# Patient Record
Sex: FEMALE | Race: WHITE | NOT HISPANIC OR LATINO | Employment: OTHER | URBAN - METROPOLITAN AREA
[De-identification: names, ages, dates, MRNs, and addresses within clinical notes are randomized per-mention and may not be internally consistent; named-entity substitution may affect disease eponyms.]

---

## 2017-04-26 ENCOUNTER — APPOINTMENT (OUTPATIENT)
Dept: WOMENS IMAGING | Facility: HOSPITAL | Age: 62
End: 2017-04-26

## 2017-04-26 PROCEDURE — 77067 SCR MAMMO BI INCL CAD: CPT | Performed by: RADIOLOGY

## 2017-04-26 PROCEDURE — 77063 BREAST TOMOSYNTHESIS BI: CPT | Performed by: RADIOLOGY

## 2017-04-26 PROCEDURE — G0202 SCR MAMMO BI INCL CAD: HCPCS | Performed by: RADIOLOGY

## 2018-06-06 ENCOUNTER — APPOINTMENT (OUTPATIENT)
Dept: WOMENS IMAGING | Facility: HOSPITAL | Age: 63
End: 2018-06-06

## 2018-06-06 PROCEDURE — 77067 SCR MAMMO BI INCL CAD: CPT | Performed by: RADIOLOGY

## 2018-06-06 PROCEDURE — 77063 BREAST TOMOSYNTHESIS BI: CPT | Performed by: RADIOLOGY

## 2019-07-23 ENCOUNTER — OFFICE VISIT (OUTPATIENT)
Dept: GASTROENTEROLOGY | Facility: CLINIC | Age: 64
End: 2019-07-23

## 2019-07-23 VITALS
BODY MASS INDEX: 23.76 KG/M2 | WEIGHT: 139.2 LBS | DIASTOLIC BLOOD PRESSURE: 68 MMHG | TEMPERATURE: 97.7 F | SYSTOLIC BLOOD PRESSURE: 124 MMHG | HEIGHT: 64 IN

## 2019-07-23 DIAGNOSIS — Z12.11 SCREENING FOR COLON CANCER: Primary | ICD-10-CM

## 2019-07-23 DIAGNOSIS — R10.12 LUQ PAIN: ICD-10-CM

## 2019-07-23 PROCEDURE — 99203 OFFICE O/P NEW LOW 30 MIN: CPT | Performed by: INTERNAL MEDICINE

## 2019-07-23 NOTE — PROGRESS NOTES
Chief Complaint   Patient presents with   • Abdominal Pain   • Discuss possible colonoscopy     Lanny Aguayo is a 63 y.o. female who presents with abdominal pain.    She has some intermittent luq pain - no obvious precipitant.  Not related to eating or bowel movements.  She denies any constipation or diarrhea.  She sometimes has seepage.  She also complains of intermittent rectal bleeding which she attributes to hemorrhoids.  He denies any nausea or heartburn.  Pain is not debilitating.    Her daughter has Crohn's - no fh crc/polyps.    Last c/s 2008 - diverticulosis, IH.       Abdominal Pain   This is a recurrent problem. The current episode started more than 1 month ago. The onset quality is gradual. The problem occurs intermittently. The most recent episode lasted 3 days. The problem has been unchanged. The pain is located in the LUQ. The pain is at a severity of 3/10. The quality of the pain is dull. The abdominal pain does not radiate. Pertinent negatives include no anorexia, arthralgias, belching, constipation, diarrhea, dysuria, fever, flatus, frequency, headaches, hematochezia, hematuria, melena, myalgias, nausea, vomiting or weight loss. Nothing aggravates the pain. The pain is relieved by nothing. Prior diagnostic workup includes CT scan.     Past Medical History:   Diagnosis Date   • Bunion of right foot    • Osteopenia      Past Surgical History:   Procedure Laterality Date   • BUNIONECTOMY Right 10/31/2016    Procedure: RT BUNIONECTOMY,  DOUBLE OSTEOTOMY W/BONE GRAFT AND 2ND HAMMER TOE REPAIR ;  Surgeon: Andrew Marx MD;  Location: [facility];  Service:    • HAMMER TOE REPAIR Right 10/31/2016    Procedure: 2ND HAMMER TOE REPAIR;  Surgeon: Andrew Marx MD;  Location: [facility];  Service:    • HYSTERECTOMY     • TENSION FREE VAGINAL TAPING WITH MINI ARC SLING         Current Outpatient Medications:   •  acetaminophen (TYLENOL) 500 MG tablet, Take 500 mg by mouth Every 6 (Six) Hours As  Needed for mild pain (1-3)., Disp: , Rfl:   •  ascorbic acid (VITAMIN C) 1000 MG tablet, Take 1,000 mg by mouth Daily., Disp: , Rfl:   •  Calcium Citrate-Vitamin D (CALCIUM CITRATE + PO), Take 500 mg by mouth Daily., Disp: , Rfl:   •  Cholecalciferol (VITAMIN D) 1000 UNITS tablet, Take 1,000 Units by mouth Daily., Disp: , Rfl:   •  ibuprofen (ADVIL,MOTRIN) 200 MG tablet, Take 200 mg by mouth Every 6 (Six) Hours As Needed for mild pain (1-3)., Disp: , Rfl:   •  MAGNESIUM PO, Take 500 mg by mouth Daily., Disp: , Rfl:   •  Multiple Vitamin (MULTI-VITAMIN PO), Take 1 tablet by mouth Daily., Disp: , Rfl:   •  Probiotic Product (PROBIOTIC DAILY PO), Take 1 capsule by mouth Daily., Disp: , Rfl:   •  TURMERIC PO, Take  by mouth., Disp: , Rfl:   Allergies   Allergen Reactions   • Amoxicillin Rash     Social History     Socioeconomic History   • Marital status:      Spouse name: Not on file   • Number of children: Not on file   • Years of education: Not on file   • Highest education level: Not on file   Tobacco Use   • Smoking status: Never Smoker   • Smokeless tobacco: Never Used   Substance and Sexual Activity   • Alcohol use: Yes     Comment: occasionally   • Drug use: No     History reviewed. No pertinent family history.  Review of Systems   Constitutional: Negative for fever and weight loss.   Gastrointestinal: Positive for abdominal pain. Negative for anorexia, constipation, diarrhea, flatus, hematochezia, melena, nausea and vomiting.   Genitourinary: Negative for dysuria, frequency and hematuria.   Musculoskeletal: Negative for arthralgias and myalgias.   Neurological: Negative for headaches.     Vitals:    07/23/19 1259   BP: 124/68   Temp: 97.7 °F (36.5 °C)         07/23/19  1259   Weight: 63.1 kg (139 lb 3.2 oz)     Physical Exam   Constitutional: She appears well-developed and well-nourished.   HENT:   Head: Normocephalic and atraumatic.   Eyes: No scleral icterus.   Abdominal: Soft. She exhibits no  distension and no mass. There is no tenderness.   Neurological: She is alert.   Skin: Skin is warm and dry.   Psychiatric: She has a normal mood and affect.     No images are attached to the encounter.  No notes on file  Lanny was seen today for abdominal pain and discuss possible colonoscopy.    Diagnoses and all orders for this visit:    Screening for colon cancer  -     Case Request; Standing  -     Case Request    LUQ pain  -     Case Request; Standing  -     Case Request    Other orders  -     Follow Anesthesia Guidelines / Standing Orders; Future  -     Obtain Informed Consent; Future  -     Implement Anesthesia orders day of procedure.; Standing  -     Obtain informed consent; Standing  -     Verify bowel prep was successful; Standing  -     Give tap water enema if bowel prep was insufficient; Standing    Plan-  · Recommend starting a fiber supplement daily to bulk her stools to decrease seepage and promote regularity-she also has a history of diverticulosis  · In for an EGD and colonoscopy for further evaluation of her symptoms and for colorectal cancer screening

## 2019-08-30 ENCOUNTER — OUTSIDE FACILITY SERVICE (OUTPATIENT)
Dept: GASTROENTEROLOGY | Facility: CLINIC | Age: 64
End: 2019-08-30

## 2019-08-30 PROCEDURE — 43239 EGD BIOPSY SINGLE/MULTIPLE: CPT | Performed by: INTERNAL MEDICINE

## 2019-08-30 PROCEDURE — 45385 COLONOSCOPY W/LESION REMOVAL: CPT | Performed by: INTERNAL MEDICINE

## 2019-09-05 ENCOUNTER — TELEPHONE (OUTPATIENT)
Dept: GASTROENTEROLOGY | Facility: CLINIC | Age: 64
End: 2019-09-05

## 2019-09-05 RX ORDER — OMEPRAZOLE 20 MG/1
20 CAPSULE, DELAYED RELEASE ORAL 2 TIMES DAILY
Qty: 60 CAPSULE | Refills: 0 | Status: SHIPPED | OUTPATIENT
Start: 2019-09-05 | End: 2019-10-22 | Stop reason: SDUPTHER

## 2019-09-05 NOTE — TELEPHONE ENCOUNTER
Mild inflammation seen on gastric bx - rec 4 week course of omeprazole - if luq discomfort does not improve with omeprazole, rec CT scan. Schedule office f/u in 4-6 wks with elijah or me    The polyp(s) biopsies showed adenomatous change. This is not cancerous but is considered potentially precancerous. Follow-up colonoscopy in 5 years is advised.

## 2019-09-26 NOTE — TELEPHONE ENCOUNTER
call back   Received: Today   Message Contents   Mak Mounika Orlando  [facility]   Phone Number: [phone]             Pt  called and said the pt  is out of town and not able to answer he needs you to call his number only.      Call to spouse, Zeke (see hipaa).  Advise per Dr Waldron that mild inflammation seen on gastric bx - recommend 4 wk course of omeprazole.  If LUQ discomfort does not improve with omeprazole, rec CT.  Schedule office f/u in 4-6 wks.    Polyp(x)  bx showed adenomatous change.  This is not cancerous, but is considered potentially precancerous.  F/u c/s in 5 yrs is advised.    Zeke verb understanding.  Pt currently in [place].  He will check with her if picked up omeprazole as prescribed on 9/5.  Pt returning 10/7 - she will call after that to make f/u appt.

## 2019-09-26 NOTE — TELEPHONE ENCOUNTER
call back   Received: Today   Message Contents   Darion Mak RegSched Rep  [facility]   Phone Number: [phone]             Pt  is calling back.      Call to Zeke.  States omeprazole was not picked up 9/5.  Currently out of town - will try to obtain when returns for pt to begin when back from [place].

## 2019-10-22 ENCOUNTER — OFFICE VISIT (OUTPATIENT)
Dept: GASTROENTEROLOGY | Facility: CLINIC | Age: 64
End: 2019-10-22

## 2019-10-22 VITALS
WEIGHT: 142.6 LBS | DIASTOLIC BLOOD PRESSURE: 72 MMHG | SYSTOLIC BLOOD PRESSURE: 128 MMHG | HEIGHT: 64 IN | TEMPERATURE: 98.2 F | BODY MASS INDEX: 24.34 KG/M2

## 2019-10-22 DIAGNOSIS — K21.9 GASTROESOPHAGEAL REFLUX DISEASE WITHOUT ESOPHAGITIS: ICD-10-CM

## 2019-10-22 DIAGNOSIS — D12.6 ADENOMATOUS POLYP OF COLON, UNSPECIFIED PART OF COLON: ICD-10-CM

## 2019-10-22 DIAGNOSIS — K29.00 OTHER ACUTE GASTRITIS WITHOUT HEMORRHAGE: ICD-10-CM

## 2019-10-22 DIAGNOSIS — R10.12 LEFT UPPER QUADRANT PAIN: Primary | ICD-10-CM

## 2019-10-22 PROCEDURE — 99214 OFFICE O/P EST MOD 30 MIN: CPT | Performed by: NURSE PRACTITIONER

## 2019-10-22 RX ORDER — OMEPRAZOLE 20 MG/1
20 CAPSULE, DELAYED RELEASE ORAL 2 TIMES DAILY
Qty: 60 CAPSULE | Refills: 2 | Status: SHIPPED | OUTPATIENT
Start: 2019-10-22

## 2019-10-22 NOTE — PROGRESS NOTES
Chief Complaint   Patient presents with   • scope follow up       Lanny Aguayo is a  64 y.o. female here for a follow up visit for abdominal pain.    HPI  64-year-old female presents today accompanied by her  for follow-up visit for left upper quadrant abdominal pain and GERD.  She is a patient of Dr. Waldron.  She underwent EGD and colonoscopy on 8/30/2019.  EGD showed hiatal hernia with gastritis.  Path was negative.  Colonoscopy showed nonbleeding internal hemorrhoids, diverticulosis as well as an adenomatous colon polyp.  Recall in 5 years.  She continues to report issues with left upper quadrant intermittent abdominal pain worse when she eats certain foods.  She tells me sometimes breads and pastas tend to cause more problems.  She also has lots of issues with bloating.  She is taking a daily fiber supplement and admits this is helped somewhat with the issues.  She does feel like symptoms when she has bowel movement that she does not empty completely.  She did have a CT scan done earlier this year she thinks around April per her primary care that showed fatty liver and diverticulosis but was otherwise negative for any acute abdominal process.  She tells me the left upper quadrant pain is better than what it was but not 100% gone.  She tells me she is wanting to lose weight and get healthier.  She tells me she plans on seeing an integrative medicine specialist that her daughter goes to.  She is even thought about keeping a food log.  She denies any dysphagia, nausea vomiting, diarrhea, constipation, rectal bleeding or melena.  She notes her appetite is good and her weight is stable.    Past Medical History:   Diagnosis Date   • Bunion of right foot    • Osteopenia        Past Surgical History:   Procedure Laterality Date   • BUNIONECTOMY Right 10/31/2016    Procedure: RT BUNIONECTOMY,  DOUBLE OSTEOTOMY W/BONE GRAFT AND 2ND HAMMER TOE REPAIR ;  Surgeon: Andrew Marx MD;  Location: [facility];   Service:    • COLONOSCOPY  08/301/2019    tics, IH, TA   • HAMMER TOE REPAIR Right 10/31/2016    Procedure: 2ND HAMMER TOE REPAIR;  Surgeon: Andrew Marx MD;  Location: [facility] OR [facility];  Service:    • HYSTERECTOMY     • TENSION FREE VAGINAL TAPING WITH MINI ARC SLING     • UPPER GASTROINTESTINAL ENDOSCOPY  08/30/2019    HH, gastritis       Scheduled Meds:    Continuous Infusions:  No current facility-administered medications for this visit.     PRN Meds:.    Allergies   Allergen Reactions   • Amoxicillin Rash       Social History     Socioeconomic History   • Marital status:      Spouse name: Not on file   • Number of children: Not on file   • Years of education: Not on file   • Highest education level: Not on file   Tobacco Use   • Smoking status: Never Smoker   • Smokeless tobacco: Never Used   Substance and Sexual Activity   • Alcohol use: Yes     Comment: occasionally   • Drug use: No       History reviewed. No pertinent family history.    Review of Systems   Constitutional: Negative for appetite change, chills, diaphoresis, fatigue, fever and unexpected weight change.   HENT: Negative for nosebleeds, postnasal drip, sore throat, trouble swallowing and voice change.    Respiratory: Negative for cough, choking, chest tightness, shortness of breath and wheezing.    Cardiovascular: Negative for chest pain, palpitations and leg swelling.   Gastrointestinal: Positive for abdominal distention. Negative for abdominal pain, anal bleeding, blood in stool, constipation, diarrhea, nausea, rectal pain and vomiting.   Endocrine: Negative for polydipsia, polyphagia and polyuria.   Musculoskeletal: Negative for gait problem.   Skin: Negative for rash and wound.   Allergic/Immunologic: Negative for food allergies.   Neurological: Negative for dizziness, speech difficulty and light-headedness.   Psychiatric/Behavioral: Negative for confusion, self-injury, sleep disturbance and suicidal ideas.       Vitals:    10/22/19  0937   BP: 128/72   Temp: 98.2 °F (36.8 °C)       Physical Exam   Constitutional: She is oriented to person, place, and time. She appears well-developed and well-nourished. She does not appear ill. No distress.   HENT:   Head: Normocephalic.   Eyes: Pupils are equal, round, and reactive to light.   Cardiovascular: Normal rate, regular rhythm and normal heart sounds.   Pulmonary/Chest: Effort normal and breath sounds normal.   Abdominal: Soft. Bowel sounds are normal. She exhibits distension. She exhibits no mass. There is no hepatosplenomegaly. There is no tenderness. There is no rebound and no guarding. No hernia.   Musculoskeletal: Normal range of motion.   Neurological: She is alert and oriented to person, place, and time.   Skin: Skin is warm and dry.   Psychiatric: She has a normal mood and affect. Her speech is normal and behavior is normal. Judgment normal.       No images are attached to the encounter.    Assessment and plan    1. Left upper quadrant pain    2. Gastroesophageal reflux disease without esophagitis    3. Other acute gastritis without hemorrhage    4. Adenomatous polyp of colon, unspecified part of colon    Reviewed scope results with her today.  She still continues to have intermittent left upper quadrant abdominal pain that comes and goes.  She tells me it seems to be worse with foods like breads and pastas.  She did have a CT scan done at [facility] this past April which was unremarkable for any acute abdominal process.  I want her to start keeping a food log and she plans on visiting an integrative medicine doctor for more help on this.  Continue the omeprazole through December so that she has a good 3 months to hopefully heal all of the gastritis that was seen on the EGD.  Patient to follow-up with us next year.  Patient to call the office with any issues.  Patient to call the office with an update in a couple of weeks.  Recall colonoscopy in 5 years.

## 2020-01-31 ENCOUNTER — APPOINTMENT (OUTPATIENT)
Dept: WOMENS IMAGING | Facility: HOSPITAL | Age: 65
End: 2020-01-31

## 2020-01-31 PROCEDURE — 77067 SCR MAMMO BI INCL CAD: CPT | Performed by: RADIOLOGY

## 2020-01-31 PROCEDURE — 77063 BREAST TOMOSYNTHESIS BI: CPT | Performed by: RADIOLOGY

## 2020-06-16 ENCOUNTER — TELEPHONE (OUTPATIENT)
Dept: GASTROENTEROLOGY | Facility: CLINIC | Age: 65
End: 2020-06-16

## 2020-06-16 NOTE — TELEPHONE ENCOUNTER
----- Message from Lanny Aguayo sent at 6/15/2020  9:43 PM EDT -----  Regarding: Non-Urgent Medical Question  Hi Breanna, I have been having some rectal bleeding every now and then. Pretty sure that it is Hemorrhoids, Blood is bright red. It usually lasts a couple of days. Is there anything I can do to prevent this from happening or do I need to see a Doctor about it?    Thank you, Lanny Aguayo

## 2020-06-16 NOTE — TELEPHONE ENCOUNTER
Recommend keeping stools soft-add stool softeners if needed.  Use Preparation H over-the-counter suppository x7 days and then as needed if bleeding recurs.  Recent colonoscopy with hemorrhoids noted is reassuring.  If symptoms continue contact the office

## 2020-06-16 NOTE — TELEPHONE ENCOUNTER
"Call from pt.  States noted bright red blood with wiping about 3 mo's ago.  6/16 occurred again - bright red blood in toilet water and on tissue.  States \"almost felt a pop\" when wiped.    Denies pain, itching, etc.  Has not used any otc product.  Checking how best to manage.     Advise  GARTH Fontenot out of office - will send message to Dr Waldron.   "

## 2021-04-22 ENCOUNTER — APPOINTMENT (OUTPATIENT)
Dept: WOMENS IMAGING | Facility: HOSPITAL | Age: 66
End: 2021-04-22

## 2021-04-22 PROCEDURE — 77067 SCR MAMMO BI INCL CAD: CPT | Performed by: RADIOLOGY

## 2021-04-22 PROCEDURE — 77063 BREAST TOMOSYNTHESIS BI: CPT | Performed by: RADIOLOGY

## 2023-03-21 ENCOUNTER — APPOINTMENT (OUTPATIENT)
Dept: WOMENS IMAGING | Facility: HOSPITAL | Age: 68
End: 2023-03-21
Payer: MEDICARE

## 2023-03-21 PROCEDURE — 77067 SCR MAMMO BI INCL CAD: CPT | Performed by: RADIOLOGY

## 2023-03-21 PROCEDURE — 77063 BREAST TOMOSYNTHESIS BI: CPT | Performed by: RADIOLOGY

## 2024-04-01 ENCOUNTER — TRANSCRIBE ORDERS (OUTPATIENT)
Dept: CT IMAGING | Facility: HOSPITAL | Age: 69
End: 2024-04-01
Payer: MEDICARE

## 2024-04-01 DIAGNOSIS — E78.5 HYPERLIPIDEMIA, UNSPECIFIED HYPERLIPIDEMIA TYPE: Primary | ICD-10-CM

## 2024-04-04 ENCOUNTER — TRANSCRIBE ORDERS (OUTPATIENT)
Dept: ADMINISTRATIVE | Facility: HOSPITAL | Age: 69
End: 2024-04-04
Payer: MEDICARE

## 2024-04-04 DIAGNOSIS — Z13.9 SCREENING DUE: Primary | ICD-10-CM

## 2024-04-10 ENCOUNTER — APPOINTMENT (OUTPATIENT)
Dept: WOMENS IMAGING | Facility: HOSPITAL | Age: 69
End: 2024-04-10
Payer: MEDICARE

## 2024-04-10 PROCEDURE — 77067 SCR MAMMO BI INCL CAD: CPT | Performed by: RADIOLOGY

## 2024-04-10 PROCEDURE — 77063 BREAST TOMOSYNTHESIS BI: CPT | Performed by: RADIOLOGY

## 2024-04-15 ENCOUNTER — OFFICE VISIT (OUTPATIENT)
Dept: GASTROENTEROLOGY | Facility: CLINIC | Age: 69
End: 2024-04-15
Payer: MEDICARE

## 2024-04-15 ENCOUNTER — TELEPHONE (OUTPATIENT)
Dept: GASTROENTEROLOGY | Facility: CLINIC | Age: 69
End: 2024-04-15
Payer: MEDICARE

## 2024-04-15 VITALS
DIASTOLIC BLOOD PRESSURE: 80 MMHG | TEMPERATURE: 98.2 F | SYSTOLIC BLOOD PRESSURE: 147 MMHG | WEIGHT: 140.4 LBS | HEIGHT: 64 IN | HEART RATE: 64 BPM | BODY MASS INDEX: 23.97 KG/M2

## 2024-04-15 DIAGNOSIS — Z12.11 ENCOUNTER FOR SCREENING FOR MALIGNANT NEOPLASM OF COLON: Primary | ICD-10-CM

## 2024-04-15 DIAGNOSIS — K62.5 BRBPR (BRIGHT RED BLOOD PER RECTUM): ICD-10-CM

## 2024-04-15 LAB
ERYTHROCYTE [DISTWIDTH] IN BLOOD BY AUTOMATED COUNT: 14.3 % (ref 12.3–15.4)
HCT VFR BLD AUTO: 36.1 % (ref 34–46.6)
HGB BLD-MCNC: 11.7 G/DL (ref 12–15.9)
MCH RBC QN AUTO: 30.5 PG (ref 26.6–33)
MCHC RBC AUTO-ENTMCNC: 32.4 G/DL (ref 31.5–35.7)
MCV RBC AUTO: 94.3 FL (ref 79–97)
PLATELET # BLD AUTO: 260 10*3/MM3 (ref 140–450)
RBC # BLD AUTO: 3.83 10*6/MM3 (ref 3.77–5.28)
WBC # BLD AUTO: 4.62 10*3/MM3 (ref 3.4–10.8)

## 2024-04-15 PROCEDURE — 1159F MED LIST DOCD IN RCRD: CPT

## 2024-04-15 PROCEDURE — 99204 OFFICE O/P NEW MOD 45 MIN: CPT

## 2024-04-15 PROCEDURE — 1160F RVW MEDS BY RX/DR IN RCRD: CPT

## 2024-04-15 RX ORDER — ALENDRONATE SODIUM 70 MG/1
TABLET ORAL
COMMUNITY
Start: 2024-02-10

## 2024-04-15 RX ORDER — AMOXICILLIN 500 MG
CAPSULE ORAL
COMMUNITY

## 2024-04-15 NOTE — PROGRESS NOTES
"Chief Complaint  Hemorrhoids    Subjective          History of Present Illness    Lanny Aguayo is a  68 y.o. female presents for follow-up.  She is a patient of Dr. Waldron and is new to me.    Patient was recently seen in the emergency department 3/25/2024 for an episode of rectal bleeding.  Noted to have hemoglobin 11.8 at that time. Patient reports that since her visit to the emergency department she has been using Proctofoam and has not had any further episodes of rectal bleeding.  She denies abdominal pain, nausea, vomiting, decreased appetite, unintentional weight loss, difficulty swallowing.  She reports that she has pretty regular bowel movements and will occasionally feel constipated once or twice a month but generally does not have to strain.  She takes a fiber tablet each morning and has been trying to increase fiber in her diet.  She reports that she prefers more natural remedies rather than medications.    EGD 8/30/2019 showed small hiatal hernia, gastritis, normal examined duodenum.    Colonoscopy 8/30/2019 showed 1 5 mm polyp in sigmoid colon, diverticulosis, internal hemorrhoids and otherwise normal exam.  Biopsy showed adenomatous change-recommended follow-up colonoscopy 5 years.    Objective   Vital Signs:   /80   Pulse 64   Temp 98.2 °F (36.8 °C)   Ht 162.6 cm (64\")   Wt 63.7 kg (140 lb 6.4 oz)   BMI 24.10 kg/m²       Physical Exam  Constitutional:       General: She is not in acute distress.     Appearance: Normal appearance.   Eyes:      General: No scleral icterus.  Cardiovascular:      Rate and Rhythm: Normal rate.   Pulmonary:      Effort: Pulmonary effort is normal.   Abdominal:      General: Abdomen is flat. Bowel sounds are normal. There is no distension.      Tenderness: There is no abdominal tenderness. There is no guarding.   Skin:     Coloration: Skin is not jaundiced.   Neurological:      General: No focal deficit present.      Mental Status: She is alert and oriented to " person, place, and time.   Psychiatric:         Mood and Affect: Mood normal.         Behavior: Behavior normal.          Result Review :   The following data was reviewed by: Alicia Sharp PA-C on 04/15/2024:    CBC          3/25/2024    13:03   CBC   WBC 6.15       RBC 3.95       Hemoglobin 11.8       Hematocrit 36.1       MCV 91.4       MCH 29.9       MCHC 32.7       RDW 13.8       Platelets 221          Details          This result is from an external source.                     Assessment:   Diagnoses and all orders for this visit:    1. Encounter for screening for malignant neoplasm of colon (Primary)  -     Case Request; Standing  -     Case Request    2. BRBPR (bright red blood per rectum)  -     CBC (No Diff)    Other orders  -     Implement Anesthesia orders day of procedure.; Standing  -     Follow Anesthesia Guidelines / Protocol; Future  -     Verify bowel prep was successful; Standing  -     Give tap water enema if bowel prep was insufficient; Standing          Plan:   -Due for screening colonoscopy this year -so we will go ahead and get this scheduled  -Check CBC to monitor hemoglobin  -Advised that she continue fiber supplement to help keep bowels soft.  Discussed that she could use MiraLAX, but patient reports she would rather treat things naturally so we discussed potentially using smooth move tea - advised she take in proper hydration and exercise daily   -Continue omeprazole 20 mg daily      Follow Up   No follow-ups on file.    Dragon dictation used throughout this note.         Alicia Sharp PA-C  [facility]  [address] [place] [address]  Office: [phone]

## 2024-04-15 NOTE — Clinical Note
68-year-old female presenting for follow-up after ED visit for rectal bleeding.  She does have a history of hemorrhoids.  She has been using Proctofoam and has had no further episodes of bright red blood per rectum.  She does get constipated occasionally, but would like to treat this naturally so we discussed proper hydration, exercise, good fiber intake, did move tea?  He is due for colonoscopy so wanted to get that scheduled as well.

## 2024-04-15 NOTE — TELEPHONE ENCOUNTER
[facility] - FOR PSC WITH PROVIDER - PSC WILL CALL WITH ARRIVE TIME A WEEK PRIOR - DATE IS 05/16/2024

## 2024-04-16 NOTE — PROGRESS NOTES
Hemoglobin is stable to 3 weeks ago. Recommend proceeding with colonoscopy as discussed. Advise keeping bowel movements soft with fiber supplementation, smooth move tea, proper hydration, exercise, etc.

## 2024-04-17 ENCOUNTER — TELEPHONE (OUTPATIENT)
Dept: GASTROENTEROLOGY | Facility: CLINIC | Age: 69
End: 2024-04-17
Payer: MEDICARE

## 2024-04-17 NOTE — TELEPHONE ENCOUNTER
----- Message from Alicia Sharp PA-C sent at 4/16/2024  1:22 PM EDT -----  Hemoglobin is stable to 3 weeks ago. Recommend proceeding with colonoscopy as discussed. Advise keeping bowel movements soft with fiber supplementation, smooth move tea, proper hydration, exercise, etc.

## 2024-04-18 ENCOUNTER — HOSPITAL ENCOUNTER (OUTPATIENT)
Dept: ULTRASOUND IMAGING | Facility: HOSPITAL | Age: 69
Discharge: HOME | End: 2024-04-18

## 2024-04-18 ENCOUNTER — HOSPITAL ENCOUNTER (OUTPATIENT)
Dept: CT IMAGING | Facility: HOSPITAL | Age: 69
Discharge: HOME | End: 2024-04-18

## 2024-04-18 DIAGNOSIS — Z13.9 SCREENING DUE: ICD-10-CM

## 2024-04-18 DIAGNOSIS — E78.5 HYPERLIPIDEMIA, UNSPECIFIED HYPERLIPIDEMIA TYPE: ICD-10-CM

## 2024-04-18 LAB
BH CV VAS SCREENING CAROTID CCA LEFT: 92.3 CM/SEC
BH CV VAS SCREENING CAROTID CCA RIGHT: 109.6 CM/SEC
BH CV VAS SCREENING CAROTID ICA LEFT: 76.7 CM/SEC
BH CV VAS SCREENING CAROTID ICA RIGHT: 79.1 CM/SEC
BH CV XLRA MEAS - MID AO DIAM: 1.9 CM
BH CV XLRA MEAS - PAD LEFT ABI PT: 1.3
BH CV XLRA MEAS - PAD LEFT ARM: 132 MMHG
BH CV XLRA MEAS - PAD LEFT LEG PT: 170 MMHG
BH CV XLRA MEAS - PAD RIGHT ABI PT: 1.2
BH CV XLRA MEAS - PAD RIGHT ARM: 134 MMHG
BH CV XLRA MEAS - PAD RIGHT LEG PT: 166 MMHG
BH CV XLRA MEAS LEFT ICA/CCA RATIO: 0.8
BH CV XLRA MEAS RIGHT ICA/CCA RATIO: 0.7

## 2024-04-18 PROCEDURE — 75571 CT HRT W/O DYE W/CA TEST: CPT

## 2024-04-18 PROCEDURE — 93799 UNLISTED CV SVC/PROCEDURE: CPT

## 2024-04-29 ENCOUNTER — APPOINTMENT (OUTPATIENT)
Dept: WOMENS IMAGING | Facility: HOSPITAL | Age: 69
End: 2024-04-29
Payer: MEDICARE

## 2024-04-29 PROCEDURE — 76642 ULTRASOUND BREAST LIMITED: CPT | Performed by: RADIOLOGY

## 2024-04-29 PROCEDURE — G0279 TOMOSYNTHESIS, MAMMO: HCPCS | Performed by: RADIOLOGY

## 2024-04-29 PROCEDURE — 77065 DX MAMMO INCL CAD UNI: CPT | Performed by: RADIOLOGY

## 2024-05-14 ENCOUNTER — TELEPHONE (OUTPATIENT)
Dept: GASTROENTEROLOGY | Facility: CLINIC | Age: 69
End: 2024-05-14
Payer: MEDICARE

## 2024-05-14 PROBLEM — Z12.11 ENCOUNTER FOR SCREENING FOR MALIGNANT NEOPLASM OF COLON: Status: ACTIVE | Noted: 2024-04-15

## 2024-05-14 NOTE — TELEPHONE ENCOUNTER
/NIKKIE JEAN-BAPTISTE FOR COLON AT [facility] ON         08/07/2024    ARRIVE AT 1230PM  OK FOR HUB TO RElay

## 2024-05-14 NOTE — TELEPHONE ENCOUNTER
Hub staff attempted to follow warm transfer process and was unsuccessful     Caller: Lanny Aguayo    Relationship to patient: Self    Best call back number: [phone]    Patient is needing: PT IS RETURNING MISS CALL FROM OFFICE. ADVISE OF MESSAGE PER APPROVAL. PLEASE CALL PT BACK TO RESCHEDULE PROCEDURE. IF NOT ABLE TO REACH PT. IT IS OKAY TO LEAVE AN VOICEMAIL.

## 2024-05-14 NOTE — TELEPHONE ENCOUNTER
VIA FROM [facility]     MRS BATISTA JUST SAW A CARDIOLOGIST ON 5/9/24 FOR ELEVATED CORONARY CALCIUM SCORE  . THIS PLACES HER AT HIGH RISK OF A CARDIAC EVENT IN THE NEXT 5 YEARS. THE CARDIOLOGIST ORDERED A TREADMILL STRESS TEST. THIS MUST BE COMPLETED PRIOR TO HAVING HER PROCEDURE HERE. LEFT MESSAGE ON PATIENTS VOICEMAIL TO NOTIFY HER THE PROCEDURE HAS BEEN CANCELLED. ‘     OK FOR THE HUB TO RELAY I LVM FOR HER ABOUT R/S

## 2024-05-16 ENCOUNTER — OUTSIDE FACILITY SERVICE (OUTPATIENT)
Dept: GASTROENTEROLOGY | Facility: CLINIC | Age: 69
End: 2024-05-16
Payer: MEDICARE

## 2024-08-06 ENCOUNTER — ANESTHESIA EVENT (OUTPATIENT)
Dept: GASTROENTEROLOGY | Facility: HOSPITAL | Age: 69
End: 2024-08-06
Payer: MEDICARE

## 2024-08-06 RX ORDER — ROSUVASTATIN CALCIUM 10 MG/1
10 TABLET, COATED ORAL NIGHTLY
COMMUNITY
Start: 2024-05-09

## 2024-08-07 ENCOUNTER — ANESTHESIA (OUTPATIENT)
Dept: GASTROENTEROLOGY | Facility: HOSPITAL | Age: 69
End: 2024-08-07
Payer: MEDICARE

## 2024-08-07 ENCOUNTER — HOSPITAL ENCOUNTER (OUTPATIENT)
Facility: HOSPITAL | Age: 69
Setting detail: HOSPITAL OUTPATIENT SURGERY
Discharge: HOME | End: 2024-08-07
Attending: INTERNAL MEDICINE | Admitting: INTERNAL MEDICINE
Payer: MEDICARE

## 2024-08-07 VITALS
BODY MASS INDEX: 23.17 KG/M2 | TEMPERATURE: 98 F | RESPIRATION RATE: 16 BRPM | HEART RATE: 63 BPM | HEIGHT: 64 IN | SYSTOLIC BLOOD PRESSURE: 132 MMHG | OXYGEN SATURATION: 97 % | DIASTOLIC BLOOD PRESSURE: 74 MMHG | WEIGHT: 135.7 LBS

## 2024-08-07 PROCEDURE — 25010000002 PROPOFOL 10 MG/ML EMULSION: Performed by: NURSE ANESTHETIST, CERTIFIED REGISTERED

## 2024-08-07 PROCEDURE — 25810000003 LACTATED RINGERS PER 1000 ML: Performed by: INTERNAL MEDICINE

## 2024-08-07 PROCEDURE — G0105 COLORECTAL SCRN; HI RISK IND: HCPCS | Performed by: INTERNAL MEDICINE

## 2024-08-07 PROCEDURE — S0260 H&P FOR SURGERY: HCPCS | Performed by: INTERNAL MEDICINE

## 2024-08-07 RX ORDER — LIDOCAINE HYDROCHLORIDE 20 MG/ML
INJECTION, SOLUTION INFILTRATION; PERINEURAL AS NEEDED
Status: DISCONTINUED | OUTPATIENT
Start: 2024-08-07 | End: 2024-08-07 | Stop reason: SURG

## 2024-08-07 RX ORDER — PROPOFOL 10 MG/ML
VIAL (ML) INTRAVENOUS CONTINUOUS PRN
Status: DISCONTINUED | OUTPATIENT
Start: 2024-08-07 | End: 2024-08-07 | Stop reason: SURG

## 2024-08-07 RX ORDER — SODIUM CHLORIDE, SODIUM LACTATE, POTASSIUM CHLORIDE, CALCIUM CHLORIDE 600; 310; 30; 20 MG/100ML; MG/100ML; MG/100ML; MG/100ML
30 INJECTION, SOLUTION INTRAVENOUS CONTINUOUS PRN
Status: DISCONTINUED | OUTPATIENT
Start: 2024-08-07 | End: 2024-08-07 | Stop reason: HOSPADM

## 2024-08-07 RX ADMIN — SODIUM CHLORIDE, POTASSIUM CHLORIDE, SODIUM LACTATE AND CALCIUM CHLORIDE 30 ML/HR: 600; 310; 30; 20 INJECTION, SOLUTION INTRAVENOUS at 13:19

## 2024-08-07 RX ADMIN — PROPOFOL 100 MG: 10 INJECTION, EMULSION INTRAVENOUS at 13:43

## 2024-08-07 RX ADMIN — PROPOFOL 160 MCG/KG/MIN: 10 INJECTION, EMULSION INTRAVENOUS at 13:42

## 2024-08-07 RX ADMIN — LIDOCAINE HYDROCHLORIDE 60 MG: 20 INJECTION, SOLUTION INFILTRATION; PERINEURAL at 13:42

## 2024-08-07 NOTE — ANESTHESIA POSTPROCEDURE EVALUATION
Patient: Lanny Aguayo    Procedure Summary       Date: 08/07/24 [facility] / [facility] /  [facility]    Anesthesia Start: 1341 Anesthesia Stop: 1408    Procedure: COLONOSCOPY Diagnosis:       Encounter for screening for malignant neoplasm of colon      (Encounter for screening for malignant neoplasm of colon [Z12.11])    Surgeons: Keke Waldron MD Provider: Jose Reyes MD    Anesthesia Type: MAC ASA Status: 2            Anesthesia Type: MAC    Vitals  Vitals Value Taken Time   /69 08/07/24 1404   Temp     Pulse 68 08/07/24 1414   Resp 1 08/07/24 1404   SpO2 96 % 08/07/24 1414   Vitals shown include unfiled device data.        Post Anesthesia Care and Evaluation    Patient location during evaluation: PACU  Patient participation: complete - patient participated  Level of consciousness: awake and alert  Pain management: adequate    Airway patency: patent  Anesthetic complications: No anesthetic complications    Cardiovascular status: acceptable  Respiratory status: acceptable  Hydration status: acceptable    Comments: --------------------            08/07/24               1404     --------------------   BP:       124/69     Pulse:      74       Resp:     VSS     Temp:                SpO2:      97%      --------------------

## 2024-08-07 NOTE — H&P
[facility]  Pre Procedure History & Physical    Chief Complaint:   H/o adenomatous polyps    Subjective     HPI:   67 yo here today for colonoscopy.  Pt reports no FH CRC/polyps.  Patient denies GI symptoms currently.  Last exam 2019-TA x1    Past Medical History:   Past Medical History:   Diagnosis Date    Bunion of right foot     Clotting disorder 2000    Rectal bleeding    Diverticulitis of colon 2014    Hyperlipidemia     Osteopenia     Thyroid nodule        Past Surgical History:  Past Surgical History:   Procedure Laterality Date    ABDOMINAL SURGERY  1989    APPENDECTOMY  1989    BUNIONECTOMY Right 10/31/2016    Procedure: RT BUNIONECTOMY,  DOUBLE OSTEOTOMY W/BONE GRAFT AND 2ND HAMMER TOE REPAIR ;  Surgeon: Andrew Marx MD;  Location: [facility] OR [place];  Service:     COLONOSCOPY  08/301/2019    tics, IH, TA    HAMMER TOE REPAIR Right 10/31/2016    Procedure: 2ND HAMMER TOE REPAIR;  Surgeon: Andrew Marx MD;  Location: [facility] OR [place];  Service:     HYSTERECTOMY      TENSION FREE VAGINAL TAPING WITH MINI ARC SLING      UPPER GASTROINTESTINAL ENDOSCOPY  08/30/2019    HH, gastritis       Family History:  Family History   Problem Relation Age of Onset    Malig Hyperthermia Neg Hx        Social History:   reports that she has never smoked. She has never used smokeless tobacco. She reports current alcohol use of about 2.0 standard drinks of alcohol per week. She reports that she does not use drugs.    Medications:   Medications Prior to Admission   Medication Sig Dispense Refill Last Dose    acetaminophen (TYLENOL) 500 MG tablet Take 1 tablet by mouth Every 6 (Six) Hours As Needed for Mild Pain.   Past Month    ascorbic acid (VITAMIN C) 1000 MG tablet Take 1 tablet by mouth As Needed.   8/5/2024    Calcium Citrate-Vitamin D (CALCIUM CITRATE + PO) Take 500 mg by mouth Daily.   8/5/2024    Cholecalciferol (VITAMIN D) 1000 UNITS tablet Take 1 tablet by mouth Daily.   8/5/2024    ibuprofen  "(ADVIL,MOTRIN) 200 MG tablet Take 1 tablet by mouth Every 6 (Six) Hours As Needed for Mild Pain.   7/31/2024    Multiple Vitamin (MULTI-VITAMIN PO) Take 1 tablet by mouth Daily.   8/5/2024    Omega-3 Fatty Acids (fish oil) 1200 MG capsule capsule    8/5/2024    Probiotic Product (PROBIOTIC DAILY PO) Take 1 capsule by mouth Daily.   8/5/2024    rosuvastatin (CRESTOR) 10 MG tablet Take 1 tablet by mouth Every Night.   8/5/2024    TURMERIC PO Take  by mouth Daily.   8/5/2024    Zinc Sulfate 66 MG tablet Take  by mouth.   8/5/2024    alendronate (FOSAMAX) 70 MG tablet Take 1 tablet by mouth Every 7 (Seven) Days. SUNDAYS   More than a month    NON FORMULARY Take  by mouth As Needed (ACID REFLUX). Marshmellow root   More than a month       Allergies:  Amoxicillin    ROS:    Pertinent items are noted in HPI     Objective     Blood pressure 143/84, pulse 67, temperature 98 °F (36.7 °C), temperature source Oral, resp. rate 16, height 162.6 cm (64\"), weight 61.6 kg (135 lb 11.2 oz), SpO2 97%.    Physical Exam   Constitutional: Pt is oriented to person, place, and time and well-developed, well-nourished, and in no distress.   Mouth/Throat: Oropharynx is clear and moist.   Neck: Normal range of motion.   Cardiovascular: Normal rate, regular rhythm    Pulmonary/Chest: Effort normal    Abdominal: Soft. Nontender  Skin: Skin is warm and dry.   Psychiatric: Mood, memory, affect and judgment normal.     Assessment & Plan     Diagnosis:  H/o adenomatous polyps    Anticipated Surgical Procedure:  colonoscopy    The risks, benefits, and alternatives of this procedure have been discussed with the patient or the responsible party- the patient understands and agrees to proceed.                                                              "

## 2024-08-07 NOTE — DISCHARGE INSTRUCTIONS
For the next 24 hours patient needs to be with a responsible adult.    For 24 hours DO NOT drive, operate machinery, appliances, drink alcohol, make important decisions or sign legal documents.    Start with a light or bland diet if you are feeling sick to your stomach otherwise advance to regular diet as tolerated.    Follow recommendations on procedure report if provided by your doctor.    Call Dr Waldron for problems [phone].     Problems may include but not limited to: large amounts of bleeding, trouble breathing, repeated vomiting, severe unrelieved pain, fever or chills.

## 2024-08-07 NOTE — ANESTHESIA PREPROCEDURE EVALUATION
Anesthesia Evaluation     Patient summary reviewed and Nursing notes reviewed                Airway   Mallampati: II  Dental      Pulmonary - negative pulmonary ROS   Cardiovascular     ECG reviewed  Rhythm: regular  Rate: normal    (+) hyperlipidemia      Neuro/Psych- negative ROS  GI/Hepatic/Renal/Endo    (+) thyroid problem hypothyroidism    Musculoskeletal (-) negative ROS    Abdominal    Substance History   (+) alcohol use     OB/GYN negative ob/gyn ROS         Other                      Anesthesia Plan    ASA 2     MAC     intravenous induction     Anesthetic plan, risks, benefits, and alternatives have been provided, discussed and informed consent has been obtained with: patient and spouse/significant other.    CODE STATUS:

## 2025-05-28 ENCOUNTER — APPOINTMENT (OUTPATIENT)
Dept: WOMENS IMAGING | Facility: HOSPITAL | Age: 70
End: 2025-05-28
Payer: MEDICARE

## 2025-05-28 PROCEDURE — 77067 SCR MAMMO BI INCL CAD: CPT | Performed by: RADIOLOGY

## 2025-05-28 PROCEDURE — 77063 BREAST TOMOSYNTHESIS BI: CPT | Performed by: RADIOLOGY

## (undated) DEVICE — TUBING, SUCTION, 1/4" X 10', STRAIGHT: Brand: MEDLINE

## (undated) DEVICE — SENSR O2 OXIMAX FNGR A/ 18IN NONSTR

## (undated) DEVICE — ADAPT CLN BIOGUARD AIR/H2O DISP

## (undated) DEVICE — LN SMPL CO2 SHTRM SD STREAM W/M LUER

## (undated) DEVICE — KT ORCA ORCAPOD DISP STRL

## (undated) DEVICE — CANN O2 ETCO2 FITS ALL CONN CO2 SMPL A/ 7IN DISP LF